# Patient Record
Sex: FEMALE | Race: WHITE | Employment: FULL TIME | URBAN - METROPOLITAN AREA
[De-identification: names, ages, dates, MRNs, and addresses within clinical notes are randomized per-mention and may not be internally consistent; named-entity substitution may affect disease eponyms.]

---

## 2017-08-07 NOTE — TELEPHONE ENCOUNTER
LAST ANNUAL 8-1-16, LAST PAP 6-24-14 AND NEXT ANNUAL 8-31-17. AUTHORIZATION FOR 3 PACKS SENT TO THE PHARMACY PER PROTOCOL.   MY CHART MESSAGE SENT TO PT.

## 2017-09-06 ENCOUNTER — OFFICE VISIT (OUTPATIENT)
Dept: OBGYN CLINIC | Facility: CLINIC | Age: 22
End: 2017-09-06

## 2017-09-06 VITALS
SYSTOLIC BLOOD PRESSURE: 128 MMHG | BODY MASS INDEX: 50 KG/M2 | HEART RATE: 91 BPM | WEIGHT: 291 LBS | DIASTOLIC BLOOD PRESSURE: 83 MMHG

## 2017-09-06 DIAGNOSIS — Z01.419 ENCOUNTER FOR GYNECOLOGICAL EXAMINATION WITHOUT ABNORMAL FINDING: Primary | ICD-10-CM

## 2017-09-06 DIAGNOSIS — Z11.3 SCREENING EXAMINATION FOR STD (SEXUALLY TRANSMITTED DISEASE): ICD-10-CM

## 2017-09-06 DIAGNOSIS — Z12.4 SCREENING FOR MALIGNANT NEOPLASM OF CERVIX: ICD-10-CM

## 2017-09-06 PROCEDURE — 99395 PREV VISIT EST AGE 18-39: CPT | Performed by: OBSTETRICS & GYNECOLOGY

## 2017-09-06 NOTE — PROGRESS NOTES
Tequila Piper is a 23year old female  Patient's last menstrual period was 2017 (exact date). Patient presents with:  Gyn Exam: Annual  Medication Request: OCP refill  .     OBSTETRICS HISTORY:  OB History    Para Term  AB L denies back pain. Skin/Breast:  Denies any breast pain, lumps, or discharge. Neurological:  denies headaches, extremity weakness or numbness. Psychiatric: denies depression or anxiety. Endocrine:   denies excessive thirst or urination.   Heme/Lymph: Estradiol (OVCON-35, 28,) 0.4-35 MG-MCG Oral Tab; Take 1 tablet by mouth daily. Pap w/ Gc./chl/Trich done. Condoms encouraged. ocps refilled x one year.  Annual visits

## 2017-09-08 LAB
C TRACH DNA SPEC QL NAA+PROBE: NEGATIVE
N GONORRHOEA DNA SPEC QL NAA+PROBE: NEGATIVE
T VAGINALIS RRNA SPEC QL NAA+PROBE: NEGATIVE

## 2017-09-12 NOTE — PROGRESS NOTES
Send letter:  Normal pap. Negative Gonorrhea / Chlamydia / Trichomonas screening. Next exam in one year (will do pap if warrantied).

## 2018-09-05 ENCOUNTER — OFFICE VISIT (OUTPATIENT)
Dept: OBGYN CLINIC | Facility: CLINIC | Age: 23
End: 2018-09-05
Payer: COMMERCIAL

## 2018-09-05 VITALS
HEIGHT: 63.5 IN | WEIGHT: 293 LBS | DIASTOLIC BLOOD PRESSURE: 88 MMHG | HEART RATE: 79 BPM | SYSTOLIC BLOOD PRESSURE: 127 MMHG | BODY MASS INDEX: 51.27 KG/M2

## 2018-09-05 DIAGNOSIS — Z11.3 SCREENING EXAMINATION FOR STD (SEXUALLY TRANSMITTED DISEASE): ICD-10-CM

## 2018-09-05 DIAGNOSIS — Z01.419 ENCOUNTER FOR GYNECOLOGICAL EXAMINATION WITHOUT ABNORMAL FINDING: Primary | ICD-10-CM

## 2018-09-05 DIAGNOSIS — Z30.41 ORAL CONTRACEPTIVE PILL SURVEILLANCE: ICD-10-CM

## 2018-09-05 PROCEDURE — 99395 PREV VISIT EST AGE 18-39: CPT | Performed by: OBSTETRICS & GYNECOLOGY

## 2018-10-01 ENCOUNTER — TELEPHONE (OUTPATIENT)
Dept: OBGYN CLINIC | Facility: CLINIC | Age: 23
End: 2018-10-01

## 2018-10-01 NOTE — TELEPHONE ENCOUNTER
----- Message from Vicente Overton MD sent at 9/20/2018 12:47 PM CDT -----  Please call pt and inform her of results attached

## 2019-09-26 RX ORDER — NORETHINDRONE AND ETHINYL ESTRADIOL 0.4-0.035
KIT ORAL
Qty: 84 TABLET | Refills: 0 | Status: SHIPPED | OUTPATIENT
Start: 2019-09-26 | End: 2019-10-30

## 2019-09-26 NOTE — TELEPHONE ENCOUNTER
Last annual=9/5/18 LPS=9/6/17  Next annual= 10/30/19    3 month OCP sent per pt request per protocol.

## 2019-10-30 ENCOUNTER — OFFICE VISIT (OUTPATIENT)
Dept: OBGYN CLINIC | Facility: CLINIC | Age: 24
End: 2019-10-30
Payer: COMMERCIAL

## 2019-10-30 VITALS
DIASTOLIC BLOOD PRESSURE: 86 MMHG | SYSTOLIC BLOOD PRESSURE: 124 MMHG | BODY MASS INDEX: 51.27 KG/M2 | HEART RATE: 93 BPM | HEIGHT: 63.5 IN | WEIGHT: 293 LBS

## 2019-10-30 DIAGNOSIS — Z30.41 ORAL CONTRACEPTIVE PILL SURVEILLANCE: ICD-10-CM

## 2019-10-30 DIAGNOSIS — Z01.419 ENCOUNTER FOR GYNECOLOGICAL EXAMINATION WITHOUT ABNORMAL FINDING: Primary | ICD-10-CM

## 2019-10-30 PROCEDURE — 99395 PREV VISIT EST AGE 18-39: CPT | Performed by: OBSTETRICS & GYNECOLOGY

## 2019-10-30 NOTE — PROGRESS NOTES
Tenna Halsted is a 30year old female  Patient's last menstrual period was 2019. Patient presents with:  Gyn Exam: annual exam -- wedding in April  Medication Request: OCP   .     OBSTETRICS HISTORY:  OB History    Para Term Preter Relationship status: Not on file      Intimate partner violence:        Fear of current or ex partner: Not on file        Emotionally abused: Not on file        Physically abused: Not on file        Forced sexual activity: Not on file    Other Topics normocephalic  Neck/Thyroid: thyroid symmetric, no thyromegaly, no nodules, no adenopathy  Lymphatic: no abnormal supraclavicular or axillary adenopathy is noted  Breast:   normal without palpable masses, tenderness, asymmetry, nipple discharge, nipple re

## 2019-11-25 ENCOUNTER — TELEPHONE (OUTPATIENT)
Dept: OBGYN CLINIC | Facility: CLINIC | Age: 24
End: 2019-11-25

## 2019-11-25 NOTE — TELEPHONE ENCOUNTER
Pt states she received a message from someone names Stephanie Burris from Dr. Natacha Owens office. I explained to pt that we do have an ANGELLA Mackenzie named Stephanie Burris. I do not see any messages in pt's chart.   The only thing I can think of is that pt had an order for a qual from 9/12/

## 2020-02-03 ENCOUNTER — PATIENT MESSAGE (OUTPATIENT)
Dept: OBGYN CLINIC | Facility: CLINIC | Age: 25
End: 2020-02-03

## 2020-02-03 NOTE — TELEPHONE ENCOUNTER
From: Karla López  To: Angela Hunter MD  Sent: 2/3/2020 4:30 PM CST  Subject: Non-Urgent [facility] Gener[facility] Dr. Skip Dailey and team,    Wanted to send a message of concern, yet again.     So you will find the message I sent last year about a brenda

## 2020-02-04 NOTE — TELEPHONE ENCOUNTER
Periods skipping does not concern me. Could be factor of her weight. Could be factor of specific ocp being given. Hormonally she is balanced thus skipping period not issue as long as taking pill correctly.  Would try to lose weight & periods may return more

## 2020-02-21 ENCOUNTER — PATIENT MESSAGE (OUTPATIENT)
Dept: OBGYN CLINIC | Facility: CLINIC | Age: 25
End: 2020-02-21

## 2020-02-21 NOTE — TELEPHONE ENCOUNTER
Sent to [facility] pt stating that she is currently on Vyflema and wants to take Philith with 90 day supply. Awaiting recs. Annual 10-30-19. Pap normal form 9-2017.

## 2020-02-21 NOTE — TELEPHONE ENCOUNTER
From: Sundar Ricks  To: Queen Lucretia MD  Sent: 2/21/2020 10:17 AM CST  Subject: Prescription Question    Good morning,     I need to update my insurance, which if that cannot be done with the images I have attached, that is fine, I can call too.

## 2020-10-29 RX ORDER — NORETHINDRONE AND ETHINYL ESTRADIOL 0.4-0.035
KIT ORAL
Qty: 84 TABLET | Refills: 3 | OUTPATIENT
Start: 2020-10-29

## 2020-10-30 ENCOUNTER — OFFICE VISIT (OUTPATIENT)
Dept: OBGYN CLINIC | Facility: CLINIC | Age: 25
End: 2020-10-30
Payer: COMMERCIAL

## 2020-10-30 VITALS
WEIGHT: 293 LBS | HEART RATE: 82 BPM | SYSTOLIC BLOOD PRESSURE: 122 MMHG | DIASTOLIC BLOOD PRESSURE: 79 MMHG | BODY MASS INDEX: 52 KG/M2

## 2020-10-30 DIAGNOSIS — Z01.419 ENCOUNTER FOR GYNECOLOGICAL EXAMINATION WITHOUT ABNORMAL FINDING: Primary | ICD-10-CM

## 2020-10-30 DIAGNOSIS — Z12.4 SCREENING FOR MALIGNANT NEOPLASM OF CERVIX: ICD-10-CM

## 2020-10-30 DIAGNOSIS — Z30.41 ORAL CONTRACEPTIVE PILL SURVEILLANCE: ICD-10-CM

## 2020-10-30 PROCEDURE — 3074F SYST BP LT 130 MM HG: CPT | Performed by: OBSTETRICS & GYNECOLOGY

## 2020-10-30 PROCEDURE — 3078F DIAST BP <80 MM HG: CPT | Performed by: OBSTETRICS & GYNECOLOGY

## 2020-10-30 PROCEDURE — 99395 PREV VISIT EST AGE 18-39: CPT | Performed by: OBSTETRICS & GYNECOLOGY

## 2020-10-30 RX ORDER — NORETHINDRONE AND ETHINYL ESTRADIOL TABLETS 0.4-0.035
1 KIT ORAL DAILY
Qty: 84 TABLET | Refills: 3 | Status: SHIPPED | OUTPATIENT
Start: 2020-10-30 | End: 2021-10-04

## 2020-11-05 NOTE — PROGRESS NOTES
Brandon Rodriguez is a 30year old female [facility] Patient's last menstrual period was 2020. Patient presents with:  Gyn Exam: annual  Medication Request: ocp refill  .     OBSTETRICS HISTORY:  OB History    Para Term  AB Living   0 0 0 status: Not on file      Intimate partner violence        Fear of current or ex partner: Not on file        Emotionally abused: Not on file        Physically abused: Not on file        Forced sexual activity: Not on file    Other Topics      Concerns: adenopathy  Lymphatic: no abnormal supraclavicular or axillary adenopathy is noted  Breast:   normal without palpable masses, tenderness, asymmetry, nipple discharge, nipple retraction or skin changes  Abdomen:   soft, nontender, nondistended, no masses  S

## 2020-11-10 LAB
AMB EXT CHOL/HDL RATIO: 3.4
AMB EXT CHOLESTEROL, TOTAL: 168 MG/DL
AMB EXT GLUCOSE: 78 MG/DL
AMB EXT HDL CHOLESTEROL: 50 MG/DL
AMB EXT LDL CHOLESTEROL, DIRECT: 65 MG/DL
AMB EXT TRIGLYCERIDES: 208 MG/DL

## 2021-02-09 ENCOUNTER — OFFICE VISIT (OUTPATIENT)
Dept: FAMILY MEDICINE CLINIC | Facility: CLINIC | Age: 26
End: 2021-02-09
Payer: COMMERCIAL

## 2021-02-09 VITALS
HEIGHT: 63.5 IN | WEIGHT: 293 LBS | HEART RATE: 81 BPM | DIASTOLIC BLOOD PRESSURE: 70 MMHG | RESPIRATION RATE: 16 BRPM | SYSTOLIC BLOOD PRESSURE: 100 MMHG | TEMPERATURE: 97 F | BODY MASS INDEX: 51.27 KG/M2

## 2021-02-09 DIAGNOSIS — E53.8 B12 DEFICIENCY: ICD-10-CM

## 2021-02-09 DIAGNOSIS — Z13.31 NEGATIVE DEPRESSION SCREENING: ICD-10-CM

## 2021-02-09 DIAGNOSIS — E55.9 VITAMIN D DEFICIENCY: ICD-10-CM

## 2021-02-09 DIAGNOSIS — Z00.00 ANNUAL PHYSICAL EXAM: Primary | ICD-10-CM

## 2021-02-09 PROCEDURE — 99385 PREV VISIT NEW AGE 18-39: CPT | Performed by: FAMILY MEDICINE

## 2021-02-09 PROCEDURE — 3008F BODY MASS INDEX DOCD: CPT | Performed by: FAMILY MEDICINE

## 2021-02-09 PROCEDURE — 3074F SYST BP LT 130 MM HG: CPT | Performed by: FAMILY MEDICINE

## 2021-02-09 PROCEDURE — 3078F DIAST BP <80 MM HG: CPT | Performed by: FAMILY MEDICINE

## 2021-02-09 NOTE — PROGRESS NOTES
Lilian Floyd is a 30year old female that presents for annual physical exam.     Patient presents with:  Physical: PHQ2: 0, CSSR: Neg      Last Pap: Pap Smear,3 Years due on 10/30/2023  Hx of abnormal pap: no  STI testing desired: no  Vaccines: due Physical activity        Days per week: Not on file        Minutes per session: Not on file      Stress: Not on file    Relationships      Social connections        Talks on phone: Not on file        Gets together: Not on file        Attends [employer] serv (Approximate)   BMI 53.01 kg/m²  Estimated body mass index is 53.01 kg/m² as calculated from the following:    Height as of this encounter: 5' 3.5\" (1.613 m). Weight as of this encounter: 304 lb (137.9 kg).    GENERAL: well developed, well nourished, in

## 2021-02-09 NOTE — PATIENT INSTRUCTIONS
Thank you for allowing me to participate in your care today. I will contact you with any results from today's visit. Lab results are typically available in 2-3 days for blood tests, and 3-5 days for any cultures or Paps.    Please let me know if you hav prediabetes All women with no symptoms who are overweight or obese and have 1 or more other risk factors for diabetes At least every 3 years.  Also, testing for diabetes during pregnancy after the 24th week.    Type 2 diabetes, prediabetes All women diagnos months after the first dose and the third dose given 6 months after the first dose   Influenza (flu) All women in this age group Once a year   Measles, mumps, rubella (MMR) All women in this age group who have no record of these infections or vaccines 1 or not up-to-date on their childhood vaccines should get all appropriate catch-up vaccines recommended by the CDC. Date Last Reviewed: 10/1/2017  © 5435-4000 [employer]. [address] [facility], [facility] [place]. All rights reserved.  This info

## 2021-02-10 ENCOUNTER — TELEPHONE (OUTPATIENT)
Dept: FAMILY MEDICINE CLINIC | Facility: CLINIC | Age: 26
End: 2021-02-10

## 2021-02-10 ENCOUNTER — PATIENT MESSAGE (OUTPATIENT)
Dept: FAMILY MEDICINE CLINIC | Facility: CLINIC | Age: 26
End: 2021-02-10

## 2021-02-10 NOTE — TELEPHONE ENCOUNTER
Patient brought lab results to OV. Provider reviewed results. Report has been abstracted and sent to scan.

## 2021-02-20 PROBLEM — E55.9 VITAMIN D DEFICIENCY: Status: ACTIVE | Noted: 2021-02-20

## 2021-02-20 PROBLEM — E53.8 B12 DEFICIENCY: Status: ACTIVE | Noted: 2021-02-20

## 2021-02-20 LAB
ABSOLUTE BASOPHILS: 50 CELLS/UL (ref 0–200)
ABSOLUTE EOSINOPHILS: 174 CELLS/UL (ref 15–500)
ABSOLUTE LYMPHOCYTES: 2515 CELLS/UL (ref 850–3900)
ABSOLUTE MONOCYTES: 672 CELLS/UL (ref 200–950)
ABSOLUTE NEUTROPHILS: 4889 CELLS/UL (ref 1500–7800)
ALBUMIN/GLOBULIN RATIO: 1.3 (CALC) (ref 1–2.5)
ALBUMIN: 3.9 G/DL (ref 3.6–5.1)
ALKALINE PHOSPHATASE: 55 U/L (ref 31–125)
ALT: 7 U/L (ref 6–29)
AST: 11 U/L (ref 10–30)
BASOPHILS: 0.6 %
BILIRUBIN, TOTAL: 0.4 MG/DL (ref 0.2–1.2)
BUN: 15 MG/DL (ref 7–25)
CALCIUM: 9.3 MG/DL (ref 8.6–10.2)
CARBON DIOXIDE: 27 MMOL/L (ref 20–32)
CHLORIDE: 105 MMOL/L (ref 98–110)
CHOL/HDLC RATIO: 2.9 (CALC)
CHOLESTEROL, TOTAL: 162 MG/DL
CREATININE: 0.61 MG/DL (ref 0.5–1.1)
EGFR IF AFRICN AM: 144 ML/MIN/1.73M2
EGFR IF NONAFRICN AM: 124 ML/MIN/1.73M2
EOSINOPHILS: 2.1 %
GLOBULIN: 3 G/DL (CALC) (ref 1.9–3.7)
GLUCOSE: 94 MG/DL (ref 65–99)
HDL CHOLESTEROL: 56 MG/DL
HEMATOCRIT: 40.2 % (ref 35–45)
HEMOGLOBIN: 13.2 G/DL (ref 11.7–15.5)
LDL-CHOLESTEROL: 78 MG/DL (CALC)
LYMPHOCYTES: 30.3 %
MCH: 26.6 PG (ref 27–33)
MCHC: 32.8 G/DL (ref 32–36)
MCV: 80.9 FL (ref 80–100)
MONOCYTES: 8.1 %
MPV: 10.9 FL (ref 7.5–12.5)
NEUTROPHILS: 58.9 %
NON-HDL CHOLESTEROL: 106 MG/DL (CALC)
PLATELET COUNT: 304 THOUSAND/UL (ref 140–400)
POTASSIUM: 4.7 MMOL/L (ref 3.5–5.3)
PROTEIN, TOTAL: 6.9 G/DL (ref 6.1–8.1)
RDW: 13.9 % (ref 11–15)
RED BLOOD CELL COUNT: 4.97 MILLION/UL (ref 3.8–5.1)
SODIUM: 139 MMOL/L (ref 135–146)
TRIGLYCERIDES: 190 MG/DL
TSH W/REFLEX TO FT4: 3.04 MIU/L
VITAMIN B12: 345 PG/ML (ref 200–1100)
VITAMIN D, 25-OH, TOTAL: 22 NG/ML (ref 30–100)
WHITE BLOOD CELL COUNT: 8.3 THOUSAND/UL (ref 3.8–10.8)

## 2021-02-20 RX ORDER — ERGOCALCIFEROL 1.25 MG/1
50000 CAPSULE ORAL WEEKLY
Qty: 12 CAPSULE | Refills: 0 | Status: SHIPPED | OUTPATIENT
Start: 2021-02-20 | End: 2021-05-21

## 2021-04-09 DIAGNOSIS — Z23 NEED FOR VACCINATION: ICD-10-CM

## 2021-10-02 RX ORDER — NORETHINDRONE AND ETHINYL ESTRADIOL 0.4-0.035
KIT ORAL
Qty: 84 TABLET | Refills: 3 | OUTPATIENT
Start: 2021-10-02

## 2021-12-24 RX ORDER — NORETHINDRONE AND ETHINYL ESTRADIOL 0.4-0.035
KIT ORAL
Qty: 84 TABLET | Refills: 3 | OUTPATIENT
Start: 2021-12-24

## 2021-12-28 RX ORDER — NORETHINDRONE AND ETHINYL ESTRADIOL TABLETS 0.4-0.035
1 KIT ORAL DAILY
Qty: 84 TABLET | Refills: 0 | Status: SHIPPED | OUTPATIENT
Start: 2021-12-28

## 2022-03-21 RX ORDER — NORETHINDRONE AND ETHINYL ESTRADIOL 0.4-0.035
1 KIT ORAL DAILY
Qty: 84 TABLET | Refills: 0 | Status: SHIPPED | OUTPATIENT
Start: 2022-03-21 | End: 2022-04-22

## 2022-04-22 ENCOUNTER — OFFICE VISIT (OUTPATIENT)
Dept: OBGYN CLINIC | Facility: CLINIC | Age: 27
End: 2022-04-22
Payer: COMMERCIAL

## 2022-04-22 VITALS
HEIGHT: 63.5 IN | BODY MASS INDEX: 51.27 KG/M2 | DIASTOLIC BLOOD PRESSURE: 81 MMHG | WEIGHT: 293 LBS | SYSTOLIC BLOOD PRESSURE: 125 MMHG | HEART RATE: 98 BPM

## 2022-04-22 DIAGNOSIS — Z30.41 ORAL CONTRACEPTIVE PILL SURVEILLANCE: ICD-10-CM

## 2022-04-22 DIAGNOSIS — Z01.419 ENCOUNTER FOR GYNECOLOGICAL EXAMINATION WITHOUT ABNORMAL FINDING: Primary | ICD-10-CM

## 2022-04-22 PROCEDURE — 99395 PREV VISIT EST AGE 18-39: CPT | Performed by: OBSTETRICS & GYNECOLOGY

## 2022-04-22 PROCEDURE — 3008F BODY MASS INDEX DOCD: CPT | Performed by: OBSTETRICS & GYNECOLOGY

## 2022-04-22 PROCEDURE — 3074F SYST BP LT 130 MM HG: CPT | Performed by: OBSTETRICS & GYNECOLOGY

## 2022-04-22 PROCEDURE — 3079F DIAST BP 80-89 MM HG: CPT | Performed by: OBSTETRICS & GYNECOLOGY

## 2022-04-22 RX ORDER — NORETHINDRONE AND ETHINYL ESTRADIOL 0.4-0.035
1 KIT ORAL DAILY
Qty: 84 TABLET | Refills: 3 | Status: SHIPPED | OUTPATIENT
Start: 2022-04-22

## 2022-11-13 ENCOUNTER — PATIENT MESSAGE (OUTPATIENT)
Dept: OBGYN CLINIC | Facility: CLINIC | Age: 27
End: 2022-11-13

## 2022-11-14 NOTE — TELEPHONE ENCOUNTER
Dr. Asim Nagy may be closer to her. She will need to check her insurance if she is \"in network\".  Also Dr. Zhao Pleasant

## 2022-11-14 NOTE — TELEPHONE ENCOUNTER
From: Arlene Degroot  To: Willa Wallace MD  Sent: 11/13/2022 7:27 PM CST  Subject: Getting off of birth control and future appts    Hello! I am looking into steps on how I can get off of my birth control. I've been using them for periods for a very long time. I also would like to find a new gyno that is just like if not very similar to Dr. Karen Hays near me in [place]. Dr. Karen Hyas is great, but the distance is starting to be too much on me and Dr. Kenia Govea schedule has gotten busier and busier over the last couple of years. I would love to see who she recommends. I still prefer a female doctor. Thank you!

## 2022-11-14 NOTE — TELEPHONE ENCOUNTER
Pt advised to finish her current pack of ocps and not to start the next pack. Message to [facility] for recs on a female gyne near [place]. See message below.

## 2023-03-02 ENCOUNTER — OFFICE VISIT (OUTPATIENT)
Dept: FAMILY MEDICINE CLINIC | Facility: CLINIC | Age: 28
End: 2023-03-02
Payer: COMMERCIAL

## 2023-03-02 VITALS
OXYGEN SATURATION: 98 % | HEART RATE: 81 BPM | TEMPERATURE: 97 F | DIASTOLIC BLOOD PRESSURE: 70 MMHG | SYSTOLIC BLOOD PRESSURE: 100 MMHG | WEIGHT: 293 LBS | HEIGHT: 63.5 IN | BODY MASS INDEX: 51.27 KG/M2 | RESPIRATION RATE: 16 BRPM

## 2023-03-02 DIAGNOSIS — Z00.00 WELL ADULT EXAM: Primary | ICD-10-CM

## 2023-03-02 DIAGNOSIS — E66.01 MORBID OBESITY WITH BMI OF 50.0-59.9, ADULT (HCC): ICD-10-CM

## 2023-03-02 DIAGNOSIS — Z23 NEED FOR VACCINATION: ICD-10-CM

## 2023-03-02 PROCEDURE — 3078F DIAST BP <80 MM HG: CPT | Performed by: FAMILY MEDICINE

## 2023-03-02 PROCEDURE — 99395 PREV VISIT EST AGE 18-39: CPT | Performed by: FAMILY MEDICINE

## 2023-03-02 PROCEDURE — 3074F SYST BP LT 130 MM HG: CPT | Performed by: FAMILY MEDICINE

## 2023-03-02 PROCEDURE — 90471 IMMUNIZATION ADMIN: CPT | Performed by: FAMILY MEDICINE

## 2023-03-02 PROCEDURE — 3008F BODY MASS INDEX DOCD: CPT | Performed by: FAMILY MEDICINE

## 2023-03-02 PROCEDURE — 90715 TDAP VACCINE 7 YRS/> IM: CPT | Performed by: FAMILY MEDICINE

## 2023-03-03 ENCOUNTER — PATIENT MESSAGE (OUTPATIENT)
Dept: FAMILY MEDICINE CLINIC | Facility: CLINIC | Age: 28
End: 2023-03-03

## 2023-03-03 DIAGNOSIS — E66.01 MORBID OBESITY WITH BMI OF 50.0-59.9, ADULT (HCC): ICD-10-CM

## 2023-03-03 DIAGNOSIS — R79.89 LOW VITAMIN D LEVEL: ICD-10-CM

## 2023-03-03 DIAGNOSIS — Z00.00 LABORATORY EXAM ORDERED AS PART OF ROUTINE GENERAL MEDICAL EXAMINATION: Primary | ICD-10-CM

## 2023-03-06 NOTE — TELEPHONE ENCOUNTER
From: Misti De León  To: Javier Villarreal DO  Sent: 3/3/2023 9:23 PM CST  Subject: Blood work     Hello! Following up from my Thursday appointment. I realized that our closest [facility] is no more as they are closing the [employer]. Am I eligible to get blood work done through ? If so, how do I schedule it? Is that covered with my insurance?     Thanks,  Jennifer

## 2023-03-08 ENCOUNTER — LAB ENCOUNTER (OUTPATIENT)
Dept: LAB | Age: 28
End: 2023-03-08
Attending: FAMILY MEDICINE
Payer: COMMERCIAL

## 2023-03-08 DIAGNOSIS — Z00.00 LABORATORY EXAM ORDERED AS PART OF ROUTINE GENERAL MEDICAL EXAMINATION: ICD-10-CM

## 2023-03-08 DIAGNOSIS — R79.89 LOW VITAMIN D LEVEL: ICD-10-CM

## 2023-03-08 DIAGNOSIS — E66.01 MORBID OBESITY WITH BMI OF 50.0-59.9, ADULT (HCC): ICD-10-CM

## 2023-03-08 LAB
ALBUMIN SERPL-MCNC: 3.5 G/DL (ref 3.4–5)
ALBUMIN/GLOB SERPL: 0.9 {RATIO} (ref 1–2)
ALP LIVER SERPL-CCNC: 66 U/L
ALT SERPL-CCNC: 21 U/L
ANION GAP SERPL CALC-SCNC: 7 MMOL/L (ref 0–18)
AST SERPL-CCNC: 18 U/L (ref 15–37)
BASOPHILS # BLD AUTO: 0.05 X10(3) UL (ref 0–0.2)
BASOPHILS NFR BLD AUTO: 0.6 %
BILIRUB SERPL-MCNC: 0.5 MG/DL (ref 0.1–2)
BUN BLD-MCNC: 11 MG/DL (ref 7–18)
CALCIUM BLD-MCNC: 9.6 MG/DL (ref 8.5–10.1)
CHLORIDE SERPL-SCNC: 107 MMOL/L (ref 98–112)
CHOLEST SERPL-MCNC: 165 MG/DL (ref ?–200)
CO2 SERPL-SCNC: 26 MMOL/L (ref 21–32)
CREAT BLD-MCNC: 0.7 MG/DL
EOSINOPHIL # BLD AUTO: 0.24 X10(3) UL (ref 0–0.7)
EOSINOPHIL NFR BLD AUTO: 3 %
ERYTHROCYTE [DISTWIDTH] IN BLOOD BY AUTOMATED COUNT: 14.2 %
EST. AVERAGE GLUCOSE BLD GHB EST-MCNC: 117 MG/DL (ref 68–126)
FASTING PATIENT LIPID ANSWER: YES
FASTING STATUS PATIENT QL REPORTED: YES
GFR SERPLBLD BASED ON 1.73 SQ M-ARVRAT: 120 ML/MIN/1.73M2 (ref 60–?)
GLOBULIN PLAS-MCNC: 3.9 G/DL (ref 2.8–4.4)
GLUCOSE BLD-MCNC: 93 MG/DL (ref 70–99)
HBA1C MFR BLD: 5.7 % (ref ?–5.7)
HCT VFR BLD AUTO: 42.6 %
HDLC SERPL-MCNC: 37 MG/DL (ref 40–59)
HGB BLD-MCNC: 13.5 G/DL
IMM GRANULOCYTES # BLD AUTO: 0.02 X10(3) UL (ref 0–1)
IMM GRANULOCYTES NFR BLD: 0.3 %
LDLC SERPL CALC-MCNC: 91 MG/DL (ref ?–100)
LYMPHOCYTES # BLD AUTO: 2.65 X10(3) UL (ref 1–4)
LYMPHOCYTES NFR BLD AUTO: 33.3 %
MCH RBC QN AUTO: 26.7 PG (ref 26–34)
MCHC RBC AUTO-ENTMCNC: 31.7 G/DL (ref 31–37)
MCV RBC AUTO: 84.2 FL
MONOCYTES # BLD AUTO: 0.69 X10(3) UL (ref 0.1–1)
MONOCYTES NFR BLD AUTO: 8.7 %
NEUTROPHILS # BLD AUTO: 4.3 X10 (3) UL (ref 1.5–7.7)
NEUTROPHILS # BLD AUTO: 4.3 X10(3) UL (ref 1.5–7.7)
NEUTROPHILS NFR BLD AUTO: 54.1 %
NONHDLC SERPL-MCNC: 128 MG/DL (ref ?–130)
OSMOLALITY SERPL CALC.SUM OF ELEC: 289 MOSM/KG (ref 275–295)
PLATELET # BLD AUTO: 298 10(3)UL (ref 150–450)
POTASSIUM SERPL-SCNC: 4.4 MMOL/L (ref 3.5–5.1)
PROT SERPL-MCNC: 7.4 G/DL (ref 6.4–8.2)
RBC # BLD AUTO: 5.06 X10(6)UL
SODIUM SERPL-SCNC: 140 MMOL/L (ref 136–145)
TRIGL SERPL-MCNC: 216 MG/DL (ref 30–149)
TSI SER-ACNC: 2.24 MIU/ML (ref 0.36–3.74)
VIT D+METAB SERPL-MCNC: 31.4 NG/ML (ref 30–100)
VLDLC SERPL CALC-MCNC: 35 MG/DL (ref 0–30)
WBC # BLD AUTO: 8 X10(3) UL (ref 4–11)

## 2023-03-08 PROCEDURE — 82306 VITAMIN D 25 HYDROXY: CPT | Performed by: FAMILY MEDICINE

## 2023-03-11 PROBLEM — R73.03 PREDIABETES: Status: ACTIVE | Noted: 2023-03-11

## 2023-04-04 ENCOUNTER — TELEMEDICINE (OUTPATIENT)
Dept: FAMILY MEDICINE CLINIC | Facility: CLINIC | Age: 28
End: 2023-04-04

## 2023-04-04 DIAGNOSIS — E78.1 HIGH TRIGLYCERIDES: ICD-10-CM

## 2023-04-04 DIAGNOSIS — R73.03 PREDIABETES: Primary | ICD-10-CM

## 2023-04-04 PROCEDURE — 99213 OFFICE O/P EST LOW 20 MIN: CPT | Performed by: FAMILY MEDICINE

## 2023-05-10 ENCOUNTER — OFFICE VISIT (OUTPATIENT)
Dept: OBGYN CLINIC | Facility: CLINIC | Age: 28
End: 2023-05-10
Payer: COMMERCIAL

## 2023-05-10 VITALS
DIASTOLIC BLOOD PRESSURE: 70 MMHG | RESPIRATION RATE: 15 BRPM | SYSTOLIC BLOOD PRESSURE: 120 MMHG | HEIGHT: 63.5 IN | BODY MASS INDEX: 51.27 KG/M2 | HEART RATE: 72 BPM | WEIGHT: 293 LBS

## 2023-05-10 DIAGNOSIS — Z12.4 CERVICAL CANCER SCREENING: ICD-10-CM

## 2023-05-10 DIAGNOSIS — Z01.419 WELL WOMAN EXAM WITH ROUTINE GYNECOLOGICAL EXAM: Primary | ICD-10-CM

## 2023-05-10 PROCEDURE — 87624 HPV HI-RISK TYP POOLED RSLT: CPT | Performed by: NURSE PRACTITIONER

## 2023-05-10 PROCEDURE — 3078F DIAST BP <80 MM HG: CPT | Performed by: NURSE PRACTITIONER

## 2023-05-10 PROCEDURE — 3074F SYST BP LT 130 MM HG: CPT | Performed by: NURSE PRACTITIONER

## 2023-05-10 PROCEDURE — 3008F BODY MASS INDEX DOCD: CPT | Performed by: NURSE PRACTITIONER

## 2023-05-10 PROCEDURE — 99385 PREV VISIT NEW AGE 18-39: CPT | Performed by: NURSE PRACTITIONER

## 2023-05-10 PROCEDURE — 88175 CYTOPATH C/V AUTO FLUID REDO: CPT | Performed by: NURSE PRACTITIONER

## 2023-05-16 LAB — HPV I/H RISK 1 DNA SPEC QL NAA+PROBE: NEGATIVE

## 2024-06-26 ENCOUNTER — OFFICE VISIT (OUTPATIENT)
Dept: OBGYN CLINIC | Facility: CLINIC | Age: 29
End: 2024-06-26

## 2024-06-26 VITALS
SYSTOLIC BLOOD PRESSURE: 136 MMHG | DIASTOLIC BLOOD PRESSURE: 74 MMHG | WEIGHT: 293 LBS | HEIGHT: 63.5 IN | BODY MASS INDEX: 51.27 KG/M2 | HEART RATE: 89 BPM

## 2024-06-26 DIAGNOSIS — Z01.419 WELL WOMAN EXAM WITH ROUTINE GYNECOLOGICAL EXAM: Primary | ICD-10-CM

## 2024-06-26 PROCEDURE — 3075F SYST BP GE 130 - 139MM HG: CPT | Performed by: NURSE PRACTITIONER

## 2024-06-26 PROCEDURE — 3008F BODY MASS INDEX DOCD: CPT | Performed by: NURSE PRACTITIONER

## 2024-06-26 PROCEDURE — 3078F DIAST BP <80 MM HG: CPT | Performed by: NURSE PRACTITIONER

## 2024-06-26 PROCEDURE — 99395 PREV VISIT EST AGE 18-39: CPT | Performed by: NURSE PRACTITIONER

## 2024-06-26 RX ORDER — CHOLECALCIFEROL (VITAMIN D3) 25 MCG
1 TABLET,CHEWABLE ORAL DAILY
COMMUNITY

## 2024-06-26 RX ORDER — MULTIVIT-MIN/IRON/FOLIC ACID/K 18-600-40
CAPSULE ORAL
COMMUNITY

## 2024-06-26 RX ORDER — OMEGA-3S/DHA/EPA/FISH OIL/D3 1150-1000
LIQUID (ML) ORAL DAILY
COMMUNITY

## 2024-06-26 NOTE — PROGRESS NOTES
Here for Routine Annual Exam  No concerns or questions.  Menses have become irregular since last being seen. It was monthly after coming off the birth control pill the end of 2022. In the last 6+ months it has gone to every other month. .  They are not actively attempting conception but she is worried about it since she is no longer menstruating monthly. She has not tried OPK yet.    ROS: No Cardiac, Respiratory, GI,  or Neurological symptoms.    PE:  GENERAL: well developed, well nourished, in no apparent distress  SKIN: no rashes, no suspicious lesions  HEENT: normal  NECK: supple; no thyroidmegaly, no adenopathy  LUNGS: clear to auscultation  CARDIOVASCULAR: normal S1, S2, RRR  BREASTS: firm, nontendder, no palpable masses or nodes, no nipple discharge, no skin changes, no axillary adenopathy,    ABDOMEN: Soft, non distended; non tender, no masses  GYNE/: External Genitalia: Normal without lesions or erythema                      Vagina: normal without lesions, scant discharge                      Uterus: mid, mobile, non tender, normal size                     Cervix: no lesions or CMT                     Adnexa: non tender, no masses, normal size  EXTREMITIES:  non tender without edema    A/P:   1. Well woman exam with routine gynecological exam  Regular self breast exams recommended  Pap deferred  Start OPK to see if she is getting positive  Her spouse can obtain a semen analysis any time  See PCP for labs, discussed lifestyle changes for overall health     Return to clinic 1 year for routine exam, or as needed with any concerns or question

## 2024-12-26 ENCOUNTER — PATIENT MESSAGE (OUTPATIENT)
Dept: OBGYN CLINIC | Facility: CLINIC | Age: 29
End: 2024-12-26

## 2024-12-27 ENCOUNTER — TELEPHONE (OUTPATIENT)
Dept: OBGYN CLINIC | Facility: CLINIC | Age: 29
End: 2024-12-27

## 2024-12-27 DIAGNOSIS — N91.2 AMENORRHEA: Primary | ICD-10-CM

## 2024-12-27 NOTE — TELEPHONE ENCOUNTER
Patient calling to initiate prenatal care  LMP 11-20-24  Patient is 7-8 weeks on 1-15-25  Confirmation of pregnancy appointment scheduled on   Future Appointments   Date Time Provider Department Center   1/27/2025 10:30 AM [facility] US [facility] [address]   1/27/2025 12:30 PM Mago Ojeda MD [facility] [address]   2/11/2025 12:00 PM Saranya Chinchilla MD [facility] [address]       Insurance cigna    Any history of ectopic pregnancy? no  Any history of miscarriage? no  Any medications that you are taking on a regular basis other than prenatal vitamins? Allergy  (if not taking prenatal vitamins, encourage patient to start taking.)  Any bleeding since the first day of last LMP and your positive pregnancy test? no

## 2025-01-15 ENCOUNTER — PATIENT MESSAGE (OUTPATIENT)
Dept: OBGYN CLINIC | Facility: CLINIC | Age: 30
End: 2025-01-15

## 2025-01-15 DIAGNOSIS — N91.2 AMENORRHEA: Primary | ICD-10-CM

## 2025-01-15 NOTE — TELEPHONE ENCOUNTER
HCG won't trend in a predictable nature at this gestation to tell us the health or viability of the pregnancy. An ultrasound is best at this gestation which is what she is scheduled for. Please reassure patient not all women experience symptoms in the first trimester, others have some that come and go. Bloody noses and congestion are a symptom of pregnancy.

## 2025-01-15 NOTE — TELEPHONE ENCOUNTER
Established pt requesting HCG labs.  appt  1/27. Pt is anxious because pregnancy symptoms have suddenly decreased/stopped.

## 2025-01-27 ENCOUNTER — ULTRASOUND ENCOUNTER (OUTPATIENT)
Dept: OBGYN CLINIC | Facility: CLINIC | Age: 30
End: 2025-01-27
Payer: COMMERCIAL

## 2025-01-27 ENCOUNTER — OFFICE VISIT (OUTPATIENT)
Dept: OBGYN CLINIC | Facility: CLINIC | Age: 30
End: 2025-01-27
Payer: COMMERCIAL

## 2025-01-27 VITALS
HEART RATE: 102 BPM | BODY MASS INDEX: 51.27 KG/M2 | WEIGHT: 293 LBS | DIASTOLIC BLOOD PRESSURE: 84 MMHG | HEIGHT: 63.5 IN | SYSTOLIC BLOOD PRESSURE: 134 MMHG

## 2025-01-27 DIAGNOSIS — O46.90 VAGINAL BLEEDING IN PREGNANCY (HCC): Primary | ICD-10-CM

## 2025-01-27 DIAGNOSIS — N91.2 AMENORRHEA: ICD-10-CM

## 2025-01-27 PROCEDURE — 88305 TISSUE EXAM BY PATHOLOGIST: CPT | Performed by: OBSTETRICS & GYNECOLOGY

## 2025-01-27 RX ORDER — CETIRIZINE HYDROCHLORIDE 10 MG/1
TABLET ORAL
COMMUNITY

## 2025-01-27 RX ORDER — MULTIVIT-MIN/IRON/FOLIC ACID/K 18-600-40
CAPSULE ORAL
COMMUNITY

## 2025-01-27 NOTE — PROGRESS NOTES
[facility]  Obstetrics and Gynecology  History and Physical     Chief Complaint: Early Pregnancy Loss    Subjective:     Any Verduzco is a 29 year old  female presents with c/o secondary amenorrhea and positive pregnancy test.     The patient was recommended to return for further evaluation.     Patient's last menstrual period was 2024 (exact date)..       Review of Systems:  General: no complaints per category. See HPI for additional information.   Breast: no complaints per category. See HPI for additional information.   Respiratory: no complaints per category. See HPI for additional information.   Cardiovascular: no complaints per category. See HPI for additional information.   GI: no complaints per category. See HPI for additional information.   : no complaints per category. See HPI for additional information.   Heme: no complaints per category. See HPI for additional information.     OB History:   OB History    Para Term  AB Living   0 0 0 0 0 0   SAB IAB Ectopic Multiple Live Births   0 0 0 0         Gyne History:     Patient's last menstrual period was 2024 (exact date).      Meds:  Medications Ordered Prior to Encounter[1]    All:  Allergies[2]    PMH:  Past Medical History:    Obese    Prediabetes    Seasonal allergies       PSH:  History reviewed. No pertinent surgical history.    Objective:     Vitals:    25 1233   BP: 134/84   Pulse: 102   Weight: (!) 302 lb (137 kg)   Height: 63.5\"         Body mass index is 52.66 kg/m².    General: AAO.NAD.   CVS exam: normal peripheral perfusion  Chest: non-labored breathing, no tachypnea   Abdominal exam: deferred   Pelvic exam: deferred     Imaging:  Ultrasound scan performed transvaginally.     LMP 2024     Uterus is anteverted and within normal limits.   Cervix has three inhomogeneous fluid-like collections without increased color flow, with largest one with appearance of gestational sac WITHOUT  yolk sac. No fetal pole noted.  Recommend repeat ultrasound in 2 weeks for dating.  No free fluid and no adnexal masses.  Normal ovaries bilaterally.     Recommend beta hcg titers and repeat ultrasound in 14 days given presence of a gestational sac without a yolk sac.          Assessment:     Any Verduzco is a 29 year old  female who presents for secondary amenorrhea and positive pregnancy test, now with ultrasound findings diagnostic for early pregnancy loss.       Plan:     Patient Active Problem List    Diagnosis    High triglycerides    Prediabetes    Morbid obesity with BMI of 50.0-59.9, adult (HCC)    Vitamin D deficiency    B12 deficiency    Seasonal allergies    Obese    Dysmenorrhea       Suspected abnormal pregnancy   - Discussed with patient ultrasound findings suggestive of, but NOT diagnostic of early pregnancy loss, condolences provided. Ultrasound findings reviewed. Condolences provided.   - Discussed recommendation for repeat pelvic ultrasound in 2 weeks given presence of a gestational sac without yolk sac  - We discussed that pregnancy is suggestive of early pregnancy loss and briefly reviewed options for management, including expectant management for 2 weeks, medication management with misoprostol, or surgical management with dilation and curettage.   - Bleeding and ER precautions reviewed   - Discussed recommendation to trend beta hcg x2 and blood type ordered for possible RhoGam.   - We reviewed options for evaluation for recurrent pregnancy loss, noting that patient DOES NOT meet criteria at this time.     All of the findings and plan were discussed with the patient.  They note understanding and agreement with the plan of care.  All questions were answered to the best of my ability at this time.      Total patient time was 30 minutes in evaluation, consultation, and coordination of care. This included face to face and non-face to face actions. The patient's questions and  concerns that were addressed.     RTC 2 weeks with ultrasound prior to appointment     Mago Ojeda MD   [facility] - OBGYN      Note to patient and family   The 21st Century Cures Act makes medical notes available to patients in the interest of transparency.  However, please be advised that this is a medical document.  It is intended as rrye-ks-pukk communication.  It is written and medical language may contain abbreviations or verbiage that are technical and unfamiliar.  It may appear blunt or direct.  Medical documents are intended to carry relevant information, facts as evident, and the clinical opinion of the practitioner.           [1]   Current Outpatient Medications on File Prior to Visit   Medication Sig Dispense Refill    cetirizine (ZYRTEC ALLERGY) 10 MG Oral Tab       Prenatal MV-Min-Fe Fum-FA-DHA (PRENATAL/FOLIC ACID+DHA) 27-0.8-200 MG Oral Cap       Omega-3 Fatty Acids (FISH OIL) 600 MG Oral Cap        No current facility-administered medications on file prior to visit.   [2] No Known Allergies

## 2025-01-28 ENCOUNTER — PATIENT MESSAGE (OUTPATIENT)
Dept: OBGYN CLINIC | Facility: CLINIC | Age: 30
End: 2025-01-28

## 2025-01-28 ENCOUNTER — LAB ENCOUNTER (OUTPATIENT)
Dept: LAB | Age: 30
End: 2025-01-28
Attending: OBSTETRICS & GYNECOLOGY
Payer: COMMERCIAL

## 2025-01-28 DIAGNOSIS — N91.2 AMENORRHEA: Primary | ICD-10-CM

## 2025-01-28 DIAGNOSIS — N91.2 AMENORRHEA: ICD-10-CM

## 2025-01-28 DIAGNOSIS — O03.9 MISCARRIAGE (HCC): ICD-10-CM

## 2025-01-28 LAB
B-HCG SERPL-ACNC: 2037.7 MIU/ML
RH BLOOD TYPE: POSITIVE

## 2025-01-28 PROCEDURE — 84702 CHORIONIC GONADOTROPIN TEST: CPT

## 2025-01-28 PROCEDURE — 86900 BLOOD TYPING SEROLOGIC ABO: CPT

## 2025-01-28 PROCEDURE — 36415 COLL VENOUS BLD VENIPUNCTURE: CPT

## 2025-01-28 PROCEDURE — 86901 BLOOD TYPING SEROLOGIC RH(D): CPT

## 2025-01-30 ENCOUNTER — LAB ENCOUNTER (OUTPATIENT)
Dept: LAB | Age: 30
End: 2025-01-30
Attending: OBSTETRICS & GYNECOLOGY
Payer: COMMERCIAL

## 2025-01-30 DIAGNOSIS — N91.2 AMENORRHEA: ICD-10-CM

## 2025-01-30 LAB — B-HCG SERPL-ACNC: 849 MIU/ML

## 2025-01-30 PROCEDURE — 36415 COLL VENOUS BLD VENIPUNCTURE: CPT

## 2025-01-30 PROCEDURE — 84702 CHORIONIC GONADOTROPIN TEST: CPT

## 2025-01-31 DIAGNOSIS — O26.91 ABNORMAL PREGNANCY IN FIRST TRIMESTER (HCC): Primary | ICD-10-CM

## 2025-02-01 NOTE — TELEPHONE ENCOUNTER
[facility]  Obstetrics and Gynecology   After Hours Phone Call Documentation     Subjective:     Called patient to discuss answers to questions sent in [employer]. She continues to have vaginal bleeding with blood clots but notes it is lighter.     Assessment/Plan:     - Discussed with patient that given downtrending beta hcg, likely suggestive of early pregnancy loss.   - Recommended seeking ER/emergency care this weekend for any heavy vaginal bleeding soaking through >1 pad/hour for 2 consecutive hours, severe abdomino-pelvic pain, or symptomatic anemia. Patient voiced understanding.  - Outpatient pelvic ultrasound ordered for Mon 2/3-Tues 2/4. Discussed with patient that given downtrending beta hcg and if ultrasound findings were consistent with no evidence of retained products of conception, would continue to trend beta hcg down to zero before attempting to conceive. Patient voiced understanding. However, if ultrasound was suggestive of retained products of conception, would discuss surgical management with outpatient US-guided suction D&C procedure.   - Discussed that if patient had difficulty with scheduling ultrasound, would order on Mon 2/3 as STAT for priority read and results.   - Patient is not interested in bringing any POC she passes at home to the office.     Total patient time was 10 minutes in evaluation, consultation, and coordination of care. The patient's questions and concerns were addressed.     Mago Ojeda MD   [facility] - OBGYN         Note to patient and family   The 21st Century Cures Act makes medical notes available to patients in the interest of transparency.  However, please be advised that this is a medical document.  It is intended as cvsr-xw-ylwi communication.  It is written and medical language may contain abbreviations or verbiage that are technical and unfamiliar.  It may appear blunt or direct.  Medical documents are intended to carry relevant information, facts as evident,  and the clinical opinion of the practitioner.

## 2025-02-04 NOTE — TELEPHONE ENCOUNTER
[facility]  Obstetrics and Gynecology   After Hours Phone Call Documentation     Subjective:     Called patient back to enquire about status of vaginal bleeding.     She reports sending [email or web address] messages to try to coordinate discussion of care. She notes her vaginal bleeding was heavier with clots on Saturday 2/1 but is now light. She denies symptomatic anemia or pelvic pain.      Assessment/Plan:     -Discussed with patient that stat pelvic ultrasound would not be indicated based on her symptoms. Alarm symptoms reviewed.   -Recommended continuing to trend beta hcg levels for evaluation, and if beta hcg was undetectable before next appointment, would not need to undergo repeat pelvic ultrasound unless persistent vaginal bleeding. Patient voiced understanding.   -Pelvic ultrasound order discontinued.     Advised to keep upcoming 2/10 appointment with sono at this time (which can be cancelled if not indicated).         Total patient time was 5 minutes in evaluation, consultation, and coordination of care. The patient's questions and concerns were addressed.     Mago Ojeda MD   [facility] - OBGYN         Note to patient and family   The 21st Century Cures Act makes medical notes available to patients in the interest of transparency.  However, please be advised that this is a medical document.  It is intended as ieda-pn-vnbo communication.  It is written and medical language may contain abbreviations or verbiage that are technical and unfamiliar.  It may appear blunt or direct.  Medical documents are intended to carry relevant information, facts as evident, and the clinical opinion of the practitioner.

## 2025-02-05 ENCOUNTER — LAB ENCOUNTER (OUTPATIENT)
Dept: LAB | Age: 30
End: 2025-02-05
Attending: OBSTETRICS & GYNECOLOGY
Payer: COMMERCIAL

## 2025-02-05 DIAGNOSIS — N91.2 AMENORRHEA: ICD-10-CM

## 2025-02-05 LAB — B-HCG SERPL-ACNC: 91.1 MIU/ML

## 2025-02-05 PROCEDURE — 84702 CHORIONIC GONADOTROPIN TEST: CPT

## 2025-02-05 PROCEDURE — 36415 COLL VENOUS BLD VENIPUNCTURE: CPT

## 2025-02-13 ENCOUNTER — LAB ENCOUNTER (OUTPATIENT)
Dept: LAB | Age: 30
End: 2025-02-13
Attending: OBSTETRICS & GYNECOLOGY
Payer: COMMERCIAL

## 2025-02-13 DIAGNOSIS — N91.2 AMENORRHEA: ICD-10-CM

## 2025-02-13 DIAGNOSIS — O02.1 MISSED ABORTION (HCC): Primary | ICD-10-CM

## 2025-02-13 LAB — B-HCG SERPL-ACNC: 12.9 MIU/ML

## 2025-02-13 PROCEDURE — 84702 CHORIONIC GONADOTROPIN TEST: CPT

## 2025-02-13 PROCEDURE — 36415 COLL VENOUS BLD VENIPUNCTURE: CPT

## 2025-02-20 ENCOUNTER — LAB ENCOUNTER (OUTPATIENT)
Dept: LAB | Age: 30
End: 2025-02-20
Attending: OBSTETRICS & GYNECOLOGY
Payer: COMMERCIAL

## 2025-02-20 DIAGNOSIS — O02.1 MISSED ABORTION (HCC): ICD-10-CM

## 2025-02-20 LAB — B-HCG SERPL-ACNC: 3.6 MIU/ML

## 2025-02-20 PROCEDURE — 36415 COLL VENOUS BLD VENIPUNCTURE: CPT

## 2025-02-20 PROCEDURE — 84702 CHORIONIC GONADOTROPIN TEST: CPT

## 2025-02-21 ENCOUNTER — PATIENT MESSAGE (OUTPATIENT)
Dept: OBGYN CLINIC | Facility: CLINIC | Age: 30
End: 2025-02-21

## 2025-03-08 ENCOUNTER — TELEPHONE (OUTPATIENT)
Dept: OBGYN UNIT | Facility: HOSPITAL | Age: 30
End: 2025-03-08

## (undated) NOTE — LETTER
9/14/2017              Any Verduzco        [address] [facility] [id number]         Dear Shira Perez pap. Negative Gonorrhea / Chlamydia / Trichomonas screening. Next exam in one year (will do pap if warrantied).        Since